# Patient Record
Sex: FEMALE | Race: WHITE | Employment: OTHER | URBAN - METROPOLITAN AREA
[De-identification: names, ages, dates, MRNs, and addresses within clinical notes are randomized per-mention and may not be internally consistent; named-entity substitution may affect disease eponyms.]

---

## 2017-02-22 PROBLEM — I15.2 HYPERTENSION COMPLICATING DIABETES (HCC): Status: ACTIVE | Noted: 2017-02-22

## 2017-02-22 PROBLEM — E11.59 HYPERTENSION COMPLICATING DIABETES (HCC): Status: ACTIVE | Noted: 2017-02-22

## 2017-12-12 ENCOUNTER — HOSPITAL ENCOUNTER (OUTPATIENT)
Dept: MAMMOGRAPHY | Age: 82
Discharge: HOME | End: 2017-12-12
Attending: INTERNAL MEDICINE
Payer: MEDICARE

## 2017-12-12 DIAGNOSIS — Z78.0 ASYMPTOMATIC MENOPAUSAL STATE: ICD-10-CM

## 2017-12-12 PROCEDURE — 77080 DXA BONE DENSITY AXIAL: CPT

## 2018-07-13 ENCOUNTER — HOSPITAL ENCOUNTER (OUTPATIENT)
Dept: GENERAL RADIOLOGY | Age: 83
Discharge: HOME | End: 2018-07-13
Attending: INTERNAL MEDICINE
Payer: MEDICARE

## 2018-07-13 DIAGNOSIS — R63.4 WEIGHT LOSS: ICD-10-CM

## 2018-07-13 PROCEDURE — 71046 X-RAY EXAM CHEST 2 VIEWS: CPT

## 2019-03-01 ENCOUNTER — OFFICE VISIT (OUTPATIENT)
Dept: NEUROLOGY | Age: 84
End: 2019-03-01

## 2019-03-01 VITALS — BODY MASS INDEX: 20 KG/M2 | SYSTOLIC BLOOD PRESSURE: 124 MMHG | HEIGHT: 64 IN | DIASTOLIC BLOOD PRESSURE: 70 MMHG

## 2019-03-01 DIAGNOSIS — F02.80 LATE ONSET ALZHEIMER'S DISEASE WITHOUT BEHAVIORAL DISTURBANCE (HCC): Primary | ICD-10-CM

## 2019-03-01 DIAGNOSIS — G30.1 LATE ONSET ALZHEIMER'S DISEASE WITHOUT BEHAVIORAL DISTURBANCE (HCC): Primary | ICD-10-CM

## 2019-03-01 NOTE — PROGRESS NOTES
HISTORY OF PRESENT ILLNESS  Kvng Archer is a 80 y.o. female. This patient is an 51-year-old right-handed  female who comes in today for memory problems. She has been diagnosed with dementia. Her primary care physician already has her on adequate doses of Namenda and donepezil. The  is quite knowledgeable on dementia and just wished to get an opinion from a neurologist as to whether there is anything else that could be done for her. She no longer drives an automobile, she does pick out her own clothing and dress herself. The  does say sometimes the combination she picks her a little off and he will make some suggestions as to what she needs to wear. She apparently sleeps well and does not wander the house at night, she has lost a fair amount of weight that he tries to encourage her to eat as best he can however she continues to apparently slowly lose weight. She is not incontinent of urine and still is able to use the bathroom by herself. She will at times get very upset with him over little things but it passes quickly. She sleeps well. She had a CT scan of her head done early in the illness that did not reveal any significant abnormality however I do not have access to that image. Obviously the  is driving. Review of Systems   Constitutional:        Review of systems is positive for memory loss and poor appetite and weight loss. Complete review of systems all others negative   Constitutional: Oriented to person, place, and time, appears well-developed and well-nourished. No distress. Current Outpatient Medications on File Prior to Visit   Medication Sig Dispense Refill    folic acid (FOLVITE) 1 mg tablet take 2 tablets by mouth once daily 180 Tab 3    lisinopril (PRINIVIL, ZESTRIL) 2.5 mg tablet Take 1 Tab by mouth daily. 90 Tab 3    memantine ER (NAMENDA XR) 28 mg capsule Take 1 Cap by mouth daily.  90 Cap 3    carvedilol (COREG) 3.125 mg tablet take 1 tablet by mouth twice a day with meals 180 Tab 3    levothyroxine (SYNTHROID) 100 mcg tablet take as directed 90 Tab 3    donepezil (ARICEPT) 10 mg tablet take 1 tablet by mouth every evening 30 Tab 11    aspirin 81 mg tablet Take 81 mg by mouth daily. No current facility-administered medications on file prior to visit. Past Medical History:   Diagnosis Date    CHF (congestive heart failure) ([facility] 6/29/2012    Chronic ITP (idiopathic thrombocytopenic purpura) (HCC)     DM (diabetes mellitus) ([facility] 9/2/2014    Diet controlled.  HTN (hypertension)     Hypothyroidism 3/28/2012     Family History   Problem Relation Age of Onset    Cancer Other         breast    Cancer Mother 46        breaast    Cancer Father         Lung  (smoker)     Social History     Tobacco Use    Smoking status: Never Smoker    Smokeless tobacco: Never Used   Substance Use Topics    Alcohol use: Yes     Comment: rare    Drug use: Not on file     Pulse (P) 86   Ht 5' 4\" (1.626 m)   Wt (P) 53.1 kg (117 lb)   SpO2 (P) 97%   BMI (P) 20.08 kg/m²   Tension    Vitals reviewed. Physical Exam  Constitutional: Oriented to person,appears well-developed and well-nourished. No distress. HENT:   Head: Normocephalic and atraumatic. Mouth/Throat: Oropharynx is clear and moist. No oropharyngeal exudate. Eyes: Conjunctivae and EOM are normal. Pupils are equal, round, and reactive to light. No scleral icterus. Neck: Normal range of motion. Neck supple. No thyromegaly present. Cardiovascular: Normal rate, regular rhythm and normal heart sounds. Musculoskeletal: Normal range of motion, exhibits no edema, tenderness or deformity. Lymphadenopathy: no cervical adenopathy. Neurological: Alert and oriented to person, MMSE score today with reasonable cooperation was a 15 out of 30. Normal strength and normal reflexes. Displays no atrophy and no tremor. No cranial nerve deficit or sensory deficit.     Exhibits normal muscle tone. Displays a negative Romberg sign, no seizure activity. Coordination normal, gait normal.   No Babinski's sign on the right side. No Babinski's sign on the left side. Speech, language and mentation are normal  Visual fields are full to confrontation, funduscopic exam reveals flat discs, the retina and vasculature are normal   Skin: Skin is warm and dry. No rash noted, not diaphoretic. No erythema. Psychiatric: Normal mood and affect,   Vitals reviewed. ASSESSMENT and PLAN  DEMENTIA OF THE ALZHEIMER TYPE  I agree with her primary care physician that this is Alzheimer type dementia. She is now reached a point where I think there will be fairly rapid progression of the next year or 2. She does not appear to be having any side effects to her medications so I would not stop he is at the donepezil and memantine. She does not have sleeping problems so there is no reason to add any sleeping medications. Her behavior is manageable so there is no reason to add any neuroleptics. I do not think neuro imaging is so I see no reason to order it at this time. I had a long discussion with  about what to expect in the future and the medications that we might use if he had to do with agitated behavior or nighttime wandering. I will plan to see her back on an as-needed basis. I have asked her  to call us if sudden problems occur we may determine whether any medication intervention is.   HYPERTENSION  Stroke risk, stable, managed by primary care

## 2019-03-01 NOTE — LETTER
3/1/2019 8:50 AM 
 
Patient:  Fatuma Mancilla YOB: 1935 Date of Visit: 3/1/2019 Dear MD Amy Florez [phone] VIA In Basket 
 : Thank you for referring Ms. Hugo Yuen to me for evaluation/treatment. Below are the relevant portions of my assessment and plan of care. HISTORY OF PRESENT ILLNESS Fatuma Mancilla is a 80 y.o. female. This patient is an 40-year-old right-handed  female who comes in today for memory problems. She has been diagnosed with dementia. Her primary care physician already has her on adequate doses of Namenda and donepezil. The  is quite knowledgeable on dementia and just wished to get an opinion from a neurologist as to whether there is anything else that could be done for her. She no longer drives an automobile, she does pick out her own clothing and dress herself. The  does say sometimes the combination she picks her a little off and he will make some suggestions as to what she needs to wear. She apparently sleeps well and does not wander the house at night, she has lost a fair amount of weight that he tries to encourage her to eat as best he can however she continues to apparently slowly lose weight. She is not incontinent of urine and still is able to use the bathroom by herself. She will at times get very upset with him over little things but it passes quickly. She sleeps well. She had a CT scan of her head done early in the illness that did not reveal any significant abnormality however I do not have access to that image. Obviously the  is driving. Review of Systems Constitutional:  
     Review of systems is positive for memory loss and poor appetite and weight loss. Complete review of systems all others negative Constitutional: Oriented to person, place, and time, appears well-developed and well-nourished. No distress. Current Outpatient Medications on File Prior to Visit Medication Sig Dispense Refill  folic acid (FOLVITE) 1 mg tablet take 2 tablets by mouth once daily 180 Tab 3  
 lisinopril (PRINIVIL, ZESTRIL) 2.5 mg tablet Take 1 Tab by mouth daily. 90 Tab 3  
 memantine ER (NAMENDA XR) 28 mg capsule Take 1 Cap by mouth daily. 90 Cap 3  carvedilol (COREG) 3.125 mg tablet take 1 tablet by mouth twice a day with meals 180 Tab 3  
 levothyroxine (SYNTHROID) 100 mcg tablet take as directed 90 Tab 3  
 donepezil (ARICEPT) 10 mg tablet take 1 tablet by mouth every evening 30 Tab 11  
 aspirin 81 mg tablet Take 81 mg by mouth daily. No current facility-administered medications on file prior to visit. Past Medical History:  
Diagnosis Date  CHF (congestive heart failure) ([facility] 6/29/2012  Chronic ITP (idiopathic thrombocytopenic purpura) (HCC)  DM (diabetes mellitus) ([facility] 9/2/2014 Diet controlled.  HTN (hypertension)  Hypothyroidism 3/28/2012 Family History Problem Relation Age of Onset  Cancer Other   
     breast  
 Cancer Mother 46  
     breaast  
 Cancer Father Lung  (smoker) Social History Tobacco Use  Smoking status: Never Smoker  Smokeless tobacco: Never Used Substance Use Topics  Alcohol use: Yes Comment: rare  Drug use: Not on file Pulse (P) 86   Ht 5' 4\" (1.626 m)   Wt (P) 53.1 kg (117 lb)   SpO2 (P) 97%   BMI (P) 20.08 kg/m² Tension Vitals reviewed. Physical Exam 
Constitutional: Oriented to person,appears well-developed and well-nourished. No distress. HENT:  
Head: Normocephalic and atraumatic. Mouth/Throat: Oropharynx is clear and moist. No oropharyngeal exudate. Eyes: Conjunctivae and EOM are normal. Pupils are equal, round, and reactive to light. No scleral icterus. Neck: Normal range of motion. Neck supple. No thyromegaly present. Cardiovascular: Normal rate, regular rhythm and normal heart sounds. Musculoskeletal: Normal range of motion, exhibits no edema, tenderness or deformity. Lymphadenopathy: no cervical adenopathy. Neurological: Alert and oriented to person, MMSE score today with reasonable cooperation was a 15 out of 30. Normal strength and normal reflexes. Displays no atrophy and no tremor. No cranial nerve deficit or sensory deficit. Exhibits normal muscle tone. Displays a negative Romberg sign, no seizure activity. Coordination normal, gait normal.  
No Babinski's sign on the right side. No Babinski's sign on the left side. Speech, language and mentation are normal 
Visual fields are full to confrontation, funduscopic exam reveals flat discs, the retina and vasculature are normal  
Skin: Skin is warm and dry. No rash noted, not diaphoretic. No erythema. Psychiatric: Normal mood and affect, Vitals reviewed. ASSESSMENT and PLAN 
DEMENTIA OF THE ALZHEIMER TYPE 
I agree with her primary care physician that this is Alzheimer type dementia. She is now reached a point where I think there will be fairly rapid progression of the next year or 2. She does not appear to be having any side effects to her medications so I would not stop he is at the donepezil and memantine. She does not have sleeping problems so there is no reason to add any sleeping medications. Her behavior is manageable so there is no reason to add any neuroleptics. I do not think neuro imaging is so I see no reason to order it at this time. I had a long discussion with  about what to expect in the future and the medications that we might use if he had to do with agitated behavior or nighttime wandering. I will plan to see her back on an as-needed basis. I have asked her  to call us if sudden problems occur we may determine whether any medication intervention is. HYPERTENSION Stroke risk, stable, managed by primary care If you have questions, please do not hesitate to call me. I look forward to following Ms. Barth along with you. Sincerely, Lianet Jay MD

## 2019-03-01 NOTE — PATIENT INSTRUCTIONS

## 2019-04-29 ENCOUNTER — HOSPITAL ENCOUNTER (OUTPATIENT)
Dept: GENERAL RADIOLOGY | Age: 84
Discharge: HOME | End: 2019-04-29
Attending: INTERNAL MEDICINE
Payer: MEDICARE

## 2019-04-29 DIAGNOSIS — M25.562 ACUTE PAIN OF LEFT KNEE: ICD-10-CM

## 2019-04-29 PROCEDURE — 73562 X-RAY EXAM OF KNEE 3: CPT

## 2019-08-02 ENCOUNTER — HOSPITAL ENCOUNTER (OUTPATIENT)
Dept: GENERAL RADIOLOGY | Age: 84
Discharge: HOME | End: 2019-08-02
Attending: INTERNAL MEDICINE
Payer: MEDICARE

## 2019-08-02 DIAGNOSIS — R63.4 WEIGHT LOSS: ICD-10-CM

## 2019-08-02 PROBLEM — R26.81 UNSTEADY GAIT: Status: ACTIVE | Noted: 2019-08-02

## 2019-08-02 PROBLEM — R26.81 UNSTEADY GAIT: Status: RESOLVED | Noted: 2019-08-02 | Resolved: 2019-08-02

## 2019-08-02 PROCEDURE — 71046 X-RAY EXAM CHEST 2 VIEWS: CPT
